# Patient Record
Sex: FEMALE | URBAN - METROPOLITAN AREA
[De-identification: names, ages, dates, MRNs, and addresses within clinical notes are randomized per-mention and may not be internally consistent; named-entity substitution may affect disease eponyms.]

---

## 2023-02-15 ENCOUNTER — OFFICE VISIT (OUTPATIENT)
Dept: URBAN - METROPOLITAN AREA CLINIC 71 | Facility: CLINIC | Age: 43
End: 2023-02-15

## 2023-02-15 DIAGNOSIS — H47.10 PAPILLEDEMA: Primary | ICD-10-CM

## 2023-02-15 DIAGNOSIS — G43.B0 OPHTHALMOPLEGIC MIGRAINE, NOT INTRACTABLE: ICD-10-CM

## 2023-02-15 PROCEDURE — 99203 OFFICE O/P NEW LOW 30 MIN: CPT

## 2023-02-15 ASSESSMENT — INTRAOCULAR PRESSURE
OD: 16
OS: 17

## 2023-02-15 NOTE — IMPRESSION/PLAN
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 B0. Plan: Discussed diagnosis with the patient. Patient has a history of getting ocular migraines but is getting them more frequently now. Recommended patient avoid triggers that will bring the onset of ocular migraines as well as drinking more water and having a proper diet. Patient voiced understanding.

## 2024-01-25 ENCOUNTER — OFFICE VISIT (OUTPATIENT)
Dept: URBAN - METROPOLITAN AREA CLINIC 71 | Facility: CLINIC | Age: 44
End: 2024-01-25
Payer: COMMERCIAL

## 2024-01-25 DIAGNOSIS — H04.123 DRY EYE SYNDROME OF BILATERAL LACRIMAL GLANDS: ICD-10-CM

## 2024-01-25 DIAGNOSIS — H47.10 PAPILLEDEMA: Primary | ICD-10-CM

## 2024-01-25 DIAGNOSIS — H52.4 PRESBYOPIA: ICD-10-CM

## 2024-01-25 PROCEDURE — 99213 OFFICE O/P EST LOW 20 MIN: CPT

## 2024-01-25 ASSESSMENT — INTRAOCULAR PRESSURE
OS: 27
OD: 24

## 2024-10-31 ENCOUNTER — OFFICE VISIT (OUTPATIENT)
Dept: URBAN - METROPOLITAN AREA CLINIC 75 | Facility: CLINIC | Age: 44
End: 2024-10-31
Payer: COMMERCIAL

## 2024-10-31 DIAGNOSIS — H52.4 PRESBYOPIA: Primary | ICD-10-CM

## 2024-10-31 PROCEDURE — 92014 COMPRE OPH EXAM EST PT 1/>: CPT | Performed by: OPTOMETRIST

## 2024-10-31 ASSESSMENT — VISUAL ACUITY
OD: 20/25
OS: 20/25

## 2024-10-31 ASSESSMENT — INTRAOCULAR PRESSURE
OS: 24
OD: 22